# Patient Record
Sex: FEMALE | Race: WHITE | URBAN - METROPOLITAN AREA
[De-identification: names, ages, dates, MRNs, and addresses within clinical notes are randomized per-mention and may not be internally consistent; named-entity substitution may affect disease eponyms.]

---

## 2017-06-16 ENCOUNTER — OFFICE VISIT (OUTPATIENT)
Dept: OTOLARYNGOLOGY | Facility: CLINIC | Age: 72
End: 2017-06-16
Payer: COMMERCIAL

## 2017-06-16 VITALS
BODY MASS INDEX: 39.27 KG/M2 | HEART RATE: 66 BPM | HEIGHT: 60 IN | WEIGHT: 200 LBS | DIASTOLIC BLOOD PRESSURE: 81 MMHG | SYSTOLIC BLOOD PRESSURE: 144 MMHG

## 2017-06-16 DIAGNOSIS — H93.12 TINNITUS, LEFT: Primary | ICD-10-CM

## 2017-06-16 PROCEDURE — 99202 OFFICE O/P NEW SF 15 MIN: CPT | Performed by: OTOLARYNGOLOGY

## 2017-06-16 RX ORDER — CYANOCOBALAMIN 1000 UG/ML
1 INJECTION, SOLUTION INTRAMUSCULAR; SUBCUTANEOUS
COMMUNITY

## 2017-06-16 RX ORDER — MULTIPLE VITAMINS W/ MINERALS TAB 9MG-400MCG
1 TAB ORAL DAILY
COMMUNITY

## 2017-06-16 ASSESSMENT — ENCOUNTER SYMPTOMS
DOUBLE VISION: 0
SPUTUM PRODUCTION: 0
PHOTOPHOBIA: 0
HEARTBURN: 0
TINGLING: 0
COUGH: 0
SORE THROAT: 0
BLURRED VISION: 0
VOMITING: 0
NAUSEA: 0
TREMORS: 0
DIZZINESS: 0
STRIDOR: 0
CONSTITUTIONAL NEGATIVE: 1
BRUISES/BLEEDS EASILY: 0
HEMOPTYSIS: 0

## 2017-06-16 NOTE — NURSING NOTE
Lizz Pruett's goals for this visit include:   Chief Complaint   Patient presents with     Consult     Hearing sounds in both ears, mostly left side       She requests these members of her care team be copied on today's visit information: yes      PCP: Ana Laura William    Referring Provider:  Ana Laura William  [facility]  [address] [place] [address]    Chief Complaint   Patient presents with     Consult     Hearing sounds in both ears, mostly left side       Initial /81  Pulse 66  Ht 1.524 m (5')  Wt 90.7 kg (200 lb)  BMI 39.06 kg/m2 Estimated body mass index is 39.06 kg/(m^2) as calculated from the following:    Height as of this encounter: 1.524 m (5').    Weight as of this encounter: 90.7 kg (200 lb).  Medication Reconciliation: complete

## 2017-06-16 NOTE — PROGRESS NOTES
HPI     This is a 71 year old patient who has been having autophonia and hyperacousis for the past several months. She feels like under water. Feels in her left ear. States tinnitus in high pitches on both ears. Denies any fever, ear drainage, pain, dizziness or vertigo. No hx of trauma, surgery, or noise exposure. No hx of weight changes, bleeding from nose, difficulty swallowing or voice changes. She is otherwise healthy and is on multi vitamins.    Review of Systems   Constitutional: Negative.    HENT: Positive for tinnitus. Negative for congestion, ear discharge, ear pain, hearing loss, nosebleeds and sore throat.    Eyes: Negative for blurred vision, double vision and photophobia.   Respiratory: Negative for cough, hemoptysis, sputum production and stridor.    Gastrointestinal: Negative for heartburn, nausea and vomiting.   Skin: Negative.    Neurological: Negative for dizziness, tingling and tremors.   Endo/Heme/Allergies: Negative for environmental allergies. Does not bruise/bleed easily.         Physical Exam   Constitutional: She is well-developed, well-nourished, and in no distress.   HENT:   Head: Normocephalic and atraumatic.   Right Ear: Tympanic membrane, external ear and ear canal normal. No drainage, swelling or tenderness. No middle ear effusion. No decreased hearing is noted.   Left Ear: Tympanic membrane, external ear and ear canal normal. No drainage, swelling or tenderness.  No middle ear effusion. No decreased hearing is noted.   Nose: Nose normal.   Mouth/Throat: Uvula is midline, oropharynx is clear and moist and mucous membranes are normal. No oropharyngeal exudate.   Eyes: Pupils are equal, round, and reactive to light.   Neck: Neck supple. No tracheal deviation present. No thyromegaly present.   Lymphadenopathy:     She has no cervical adenopathy.     A/P  Autophonia, Left  Hyperacousis, left  Tinnitus, Bilateral    I will check her hearing first: Audiometry and tympanometry requested. We  need to r/o Patent ET; myoclonia of tensor tympani or any canal dehiscences.

## 2017-06-16 NOTE — MR AVS SNAPSHOT
After Visit Summary   2017    Lizz Pruett    MRN: [id number]           Patient Information     Date Of Birth          1945        Visit Information        Provider Department      2017 1:30 PM Miguel Oh MD [facility]         Follow-ups after your visit        Your next 10 appointments already scheduled     2017  4:45 PM CDT   New Visit with Dino Lambert   [facility]    [address] [place] [id number] [phone]              Who to contact     If you have questions or need follow up information about today's clinic visit or your schedule please contact [facility] directly at [phone].  Normal or non-critical lab and imaging results will be communicated to you by MyChart, letter or phone within 4 business days after the clinic has received the results. If you do not hear from us within 7 days, please contact the clinic through MyChart or phone. If you have a critical or abnormal lab result, we will notify you by phone as soon as possible.  Submit refill requests through [employer] or call your pharmacy and they will forward the refill request to us. Please allow 3 business days for your refill to be completed.          Additional Information About Your Visit        [employer] Information     [employer] is an electronic gateway that provides easy, online access to your medical records. With [employer], you can request a clinic appointment, read your test results, renew a prescription or communicate with your care team.     To sign up for [employer] visit the website at [email or web address]   You will be asked to enter the access code listed below, as well as some personal information. Please follow the directions to create your username and password.     Your access code is: [email or web address]  Expires: 2017  1:53 PM     Your access code will  in 90 days.  If you need help or a new code, please contact your [facility] or call [phone] for assistance.        Care EveryWhere ID     This is your Care EveryWhere ID. This could be used by other organizations to access your [facility] medical records  [id number]        Your Vitals Were     Pulse Height BMI (Body Mass Index)             66 1.524 m (5') 39.06 kg/m2          Blood Pressure from Last 3 Encounters:   06/16/17 144/81    Weight from Last 3 Encounters:   06/16/17 90.7 kg (200 lb)              Today, you had the following     No orders found for display       Primary Care Provider Office Phone # Fax #    [phone]       [facility] [address] [place] [address]        Thank you!     Thank you for choosing [facility]  for your care. Our goal is always to provide you with excellent care. Hearing back from our patients is one way we can continue to improve our services. Please take a few minutes to complete the written survey that you may receive in the mail after your visit with us. Thank you!             Your Updated Medication List - Protect others around you: Learn how to safely use, store and throw away your medicines at www.disposemymeds.org.          This list is accurate as of: 6/16/17  1:53 PM.  Always use your most recent med list.                   Brand Name Dispense Instructions for use    calcium carb 1250 mg (500 mg [place])/vitamin D 200 units 500-200 MG-UNIT per tablet    OSCAL with D     Take 1 tablet by mouth 2 times daily (with meals)       calcium-vitamin D 500-125 MG-UNIT Tabs          cyanocobalamin 1000 MCG/ML injection    VITAMIN B12     Inject 1 mL into the muscle every 30 days       Multi-vitamin Tabs tablet      Take 1 tablet by mouth daily       SIMVASTATIN PO

## 2017-06-19 ENCOUNTER — OFFICE VISIT (OUTPATIENT)
Dept: AUDIOLOGY | Facility: CLINIC | Age: 72
End: 2017-06-19
Payer: COMMERCIAL

## 2017-06-19 DIAGNOSIS — H90.3 BILATERAL SENSORINEURAL HEARING LOSS: Primary | ICD-10-CM

## 2017-06-19 PROCEDURE — 92557 COMPREHENSIVE HEARING TEST: CPT | Performed by: AUDIOLOGIST

## 2017-06-19 PROCEDURE — 92550 TYMPANOMETRY & REFLEX THRESH: CPT | Performed by: AUDIOLOGIST

## 2017-06-19 NOTE — MR AVS SNAPSHOT
After Visit Summary   2017    Lizz Pruett    MRN: [id number]           Patient Information     Date Of Birth          1945        Visit Information        Provider Department      2017 4:45 PM José Villarreal AuD [facility]        Today's Diagnoses     Bilateral sensorineural hearing loss    -  1       Follow-ups after your visit        Who to contact     If you have questions or need follow up information about today's clinic visit or your schedule please contact [facility] directly at [phone].  Normal or non-critical lab and imaging results will be communicated to you by [employer], letter or phone within 4 business days after the clinic has received the results. If you do not hear from us within 7 days, please contact the clinic through [employer] or phone. If you have a critical or abnormal lab result, we will notify you by phone as soon as possible.  Submit refill requests through [employer] or call your pharmacy and they will forward the refill request to us. Please allow 3 business days for your refill to be completed.          Additional Information About Your Visit        [employer] Information     [employer] is an electronic gateway that provides easy, online access to your medical records. With [employer] you can request a clinic appointment, read your test results, renew a prescription or communicate with your care team.     To sign up for [employer] visit the website at [email or web address]   You will be asked to enter the access code listed below, as well as some personal information. Please follow the directions to create your username and password.     Your access code is: [email or web address]  Expires: 2017  1:53 PM     Your access code will  in 90 days. If you need help or a new code, please contact your [facility] or call [phone] for assistance.        Care EveryWhere ID     This is your Care  EveryWhere ID. This could be used by other organizations to access your [facility] medical records  [id number]         Blood Pressure from Last 3 Encounters:   06/16/17 144/81    Weight from Last 3 Encounters:   06/16/17 200 lb (90.7 kg)              We Performed the Following     AUDIOGRAM/TYMPANOGRAM - INTERFACE     COMPREHENSIVE HEARING TEST     TYMPANOMETRY AND REFLEX THRESHOLD MEASUREMENTS        Primary Care Provider Office Phone # Fax #    Ana Laura William [phone]       [facility] [address] [place] [address]        Thank you!     Thank you for choosing [facility]  for your care. Our goal is always to provide you with excellent care. Hearing back from our patients is one way we can continue to improve our services. Please take a few minutes to complete the written survey that you may receive in the mail after your visit with us. Thank you!             Your Updated Medication List - Protect others around you: Learn how to safely use, store and throw away your medicines at www.disposemymeds.org.          This list is accurate as of: 6/19/17  5:01 PM.  Always use your most recent med list.                   Brand Name Dispense Instructions for use    calcium carb 1250 mg (500 mg [place])/vitamin D 200 units 500-200 MG-UNIT per tablet    OSCAL with D     Take 1 tablet by mouth 2 times daily (with meals)       calcium-vitamin D 500-125 MG-UNIT Tabs          cyanocobalamin 1000 MCG/ML injection    VITAMIN B12     Inject 1 mL into the muscle every 30 days       Multi-vitamin Tabs tablet      Take 1 tablet by mouth daily       SIMVASTATIN PO

## 2017-06-19 NOTE — PROGRESS NOTES
AUDIOLOGY REPORT    SUMMARY: Audiology visit completed. See audiogram for results.    RECOMMENDATIONS: Follow-up with ENT.    Xu Espinoza  Doctor of Audiology  [place] License # [id number]